# Patient Record
Sex: FEMALE | Race: OTHER | NOT HISPANIC OR LATINO
[De-identification: names, ages, dates, MRNs, and addresses within clinical notes are randomized per-mention and may not be internally consistent; named-entity substitution may affect disease eponyms.]

---

## 2017-03-27 ENCOUNTER — RESULT REVIEW (OUTPATIENT)
Age: 42
End: 2017-03-27

## 2021-11-09 ENCOUNTER — EMERGENCY (EMERGENCY)
Facility: HOSPITAL | Age: 46
LOS: 1 days | Discharge: HOME | End: 2021-11-09
Attending: EMERGENCY MEDICINE | Admitting: EMERGENCY MEDICINE
Payer: COMMERCIAL

## 2021-11-09 VITALS
OXYGEN SATURATION: 100 % | SYSTOLIC BLOOD PRESSURE: 96 MMHG | HEART RATE: 107 BPM | RESPIRATION RATE: 14 BRPM | DIASTOLIC BLOOD PRESSURE: 61 MMHG

## 2021-11-09 VITALS
RESPIRATION RATE: 16 BRPM | DIASTOLIC BLOOD PRESSURE: 79 MMHG | SYSTOLIC BLOOD PRESSURE: 101 MMHG | HEART RATE: 102 BPM | TEMPERATURE: 98 F | HEIGHT: 62 IN | OXYGEN SATURATION: 100 %

## 2021-11-09 LAB
ALBUMIN SERPL ELPH-MCNC: 3.8 G/DL — SIGNIFICANT CHANGE UP (ref 3.3–5)
ALP SERPL-CCNC: 61 U/L — SIGNIFICANT CHANGE UP (ref 40–120)
ALT FLD-CCNC: 14 U/L — SIGNIFICANT CHANGE UP (ref 4–33)
ANION GAP SERPL CALC-SCNC: 10 MMOL/L — SIGNIFICANT CHANGE UP (ref 7–14)
APPEARANCE UR: ABNORMAL
AST SERPL-CCNC: 16 U/L — SIGNIFICANT CHANGE UP (ref 4–32)
BASOPHILS # BLD AUTO: 0.03 K/UL — SIGNIFICANT CHANGE UP (ref 0–0.2)
BASOPHILS NFR BLD AUTO: 0.1 % — SIGNIFICANT CHANGE UP (ref 0–2)
BILIRUB SERPL-MCNC: 0.5 MG/DL — SIGNIFICANT CHANGE UP (ref 0.2–1.2)
BILIRUB UR-MCNC: NEGATIVE — SIGNIFICANT CHANGE UP
BLD GP AB SCN SERPL QL: NEGATIVE — SIGNIFICANT CHANGE UP
BUN SERPL-MCNC: 9 MG/DL — SIGNIFICANT CHANGE UP (ref 7–23)
CALCIUM SERPL-MCNC: 8 MG/DL — LOW (ref 8.4–10.5)
CHLORIDE SERPL-SCNC: 104 MMOL/L — SIGNIFICANT CHANGE UP (ref 98–107)
CO2 SERPL-SCNC: 21 MMOL/L — LOW (ref 22–31)
COLOR SPEC: ABNORMAL
CREAT SERPL-MCNC: 0.71 MG/DL — SIGNIFICANT CHANGE UP (ref 0.5–1.3)
DIFF PNL FLD: ABNORMAL
EOSINOPHIL # BLD AUTO: 0.05 K/UL — SIGNIFICANT CHANGE UP (ref 0–0.5)
EOSINOPHIL NFR BLD AUTO: 0.2 % — SIGNIFICANT CHANGE UP (ref 0–6)
GLUCOSE SERPL-MCNC: 168 MG/DL — HIGH (ref 70–99)
GLUCOSE UR QL: NEGATIVE — SIGNIFICANT CHANGE UP
HCG SERPL-ACNC: <5 MIU/ML — SIGNIFICANT CHANGE UP
HCT VFR BLD CALC: 26.1 % — LOW (ref 34.5–45)
HGB BLD-MCNC: 8.4 G/DL — LOW (ref 11.5–15.5)
IANC: 17.92 K/UL — HIGH (ref 1.5–8.5)
IMM GRANULOCYTES NFR BLD AUTO: 0.6 % — SIGNIFICANT CHANGE UP (ref 0–1.5)
KETONES UR-MCNC: NEGATIVE — SIGNIFICANT CHANGE UP
LEUKOCYTE ESTERASE UR-ACNC: NEGATIVE — SIGNIFICANT CHANGE UP
LYMPHOCYTES # BLD AUTO: 1.63 K/UL — SIGNIFICANT CHANGE UP (ref 1–3.3)
LYMPHOCYTES # BLD AUTO: 7.9 % — LOW (ref 13–44)
MCHC RBC-ENTMCNC: 28.2 PG — SIGNIFICANT CHANGE UP (ref 27–34)
MCHC RBC-ENTMCNC: 32.2 GM/DL — SIGNIFICANT CHANGE UP (ref 32–36)
MCV RBC AUTO: 87.6 FL — SIGNIFICANT CHANGE UP (ref 80–100)
MONOCYTES # BLD AUTO: 0.91 K/UL — HIGH (ref 0–0.9)
MONOCYTES NFR BLD AUTO: 4.4 % — SIGNIFICANT CHANGE UP (ref 2–14)
NEUTROPHILS # BLD AUTO: 17.92 K/UL — HIGH (ref 1.8–7.4)
NEUTROPHILS NFR BLD AUTO: 86.8 % — HIGH (ref 43–77)
NITRITE UR-MCNC: NEGATIVE — SIGNIFICANT CHANGE UP
NRBC # BLD: 0 /100 WBCS — SIGNIFICANT CHANGE UP
NRBC # FLD: 0.02 K/UL — HIGH
PH UR: 6.5 — SIGNIFICANT CHANGE UP (ref 5–8)
PLATELET # BLD AUTO: 304 K/UL — SIGNIFICANT CHANGE UP (ref 150–400)
POTASSIUM SERPL-MCNC: 3.7 MMOL/L — SIGNIFICANT CHANGE UP (ref 3.5–5.3)
POTASSIUM SERPL-SCNC: 3.7 MMOL/L — SIGNIFICANT CHANGE UP (ref 3.5–5.3)
PROT SERPL-MCNC: 6.8 G/DL — SIGNIFICANT CHANGE UP (ref 6–8.3)
PROT UR-MCNC: ABNORMAL
RBC # BLD: 2.98 M/UL — LOW (ref 3.8–5.2)
RBC # FLD: 13.6 % — SIGNIFICANT CHANGE UP (ref 10.3–14.5)
RH IG SCN BLD-IMP: POSITIVE — SIGNIFICANT CHANGE UP
SARS-COV-2 RNA SPEC QL NAA+PROBE: SIGNIFICANT CHANGE UP
SODIUM SERPL-SCNC: 135 MMOL/L — SIGNIFICANT CHANGE UP (ref 135–145)
SP GR SPEC: >1.05 (ref 1–1.05)
UROBILINOGEN FLD QL: SIGNIFICANT CHANGE UP
WBC # BLD: 20.66 K/UL — HIGH (ref 3.8–10.5)
WBC # FLD AUTO: 20.66 K/UL — HIGH (ref 3.8–10.5)

## 2021-11-09 PROCEDURE — 76830 TRANSVAGINAL US NON-OB: CPT | Mod: 26

## 2021-11-09 PROCEDURE — G1004: CPT

## 2021-11-09 PROCEDURE — 99285 EMERGENCY DEPT VISIT HI MDM: CPT

## 2021-11-09 PROCEDURE — 74177 CT ABD & PELVIS W/CONTRAST: CPT | Mod: 26,MG

## 2021-11-09 RX ORDER — MORPHINE SULFATE 50 MG/1
4 CAPSULE, EXTENDED RELEASE ORAL ONCE
Refills: 0 | Status: DISCONTINUED | OUTPATIENT
Start: 2021-11-09 | End: 2021-11-09

## 2021-11-09 RX ORDER — SODIUM CHLORIDE 9 MG/ML
1000 INJECTION INTRAMUSCULAR; INTRAVENOUS; SUBCUTANEOUS ONCE
Refills: 0 | Status: COMPLETED | OUTPATIENT
Start: 2021-11-09 | End: 2021-11-09

## 2021-11-09 RX ORDER — KETOROLAC TROMETHAMINE 30 MG/ML
15 SYRINGE (ML) INJECTION ONCE
Refills: 0 | Status: DISCONTINUED | OUTPATIENT
Start: 2021-11-09 | End: 2021-11-09

## 2021-11-09 RX ADMIN — SODIUM CHLORIDE 1000 MILLILITER(S): 9 INJECTION INTRAMUSCULAR; INTRAVENOUS; SUBCUTANEOUS at 07:00

## 2021-11-09 RX ADMIN — Medication 15 MILLIGRAM(S): at 10:03

## 2021-11-09 RX ADMIN — MORPHINE SULFATE 4 MILLIGRAM(S): 50 CAPSULE, EXTENDED RELEASE ORAL at 07:00

## 2021-11-09 RX ADMIN — MORPHINE SULFATE 4 MILLIGRAM(S): 50 CAPSULE, EXTENDED RELEASE ORAL at 06:02

## 2021-11-09 RX ADMIN — Medication 15 MILLIGRAM(S): at 09:20

## 2021-11-09 NOTE — ED PROVIDER NOTE - OBJECTIVE STATEMENT
45 Y f H/O heavy menses of unknown etiology, presenting with 1 day of excessive bleeding, abdominal pain. States 1 pad per hour for the past day, with new lower abdominal pain, in setting of chronic menorrhagia, with anemia. + subjective fever, dizziness, exertional dyspnea. Minimally responsive to ROS in setting of weakness. 45 Y f H/O Iron deficiency Anemia (iron infusion in August), heavy menses of unknown etiology, presenting with 1 day of excessive bleeding, abdominal pain. States 1 pad per hour for the past day, with new lower abdominal pain, in setting of chronic menorrhagia, with anemia. + subjective fever, dizziness, exertional dyspnea. Minimally responsive to ROS in setting of weakness. unciomfortable appearing.     Hematologist Dr. Joseph Perera

## 2021-11-09 NOTE — ED PROVIDER NOTE - NSFOLLOWUPCLINICS_GEN_ALL_ED_FT
[facility] Gynecology and Obstetrics  Gynceology/OB  [address] [place] [address]  Phone: [phone]  Fax:     Nidia Champagne OBGYN  OBGYN  [address] [place] [address]  Phone: [phone]  Fax: [phone]

## 2021-11-09 NOTE — ED PROVIDER NOTE - CPE EDP SKIN NORM
"Chief Complaint   Patient presents with     Physical     sports       Initial /67 (BP Location: Right arm, Patient Position: Sitting, Cuff Size: Adult Large)  Pulse 83  Temp 97.7  F (36.5  C) (Oral)  Resp 12  Ht 5' 3\" (1.6 m)  Wt 188 lb (85.3 kg)  LMP 05/03/2017  SpO2 99%  BMI 33.3 kg/m2 Estimated body mass index is 33.3 kg/(m^2) as calculated from the following:    Height as of this encounter: 5' 3\" (1.6 m).    Weight as of this encounter: 188 lb (85.3 kg).  Medication Reconciliation: complete     sma Jolynn  "
normal...

## 2021-11-09 NOTE — ED PROVIDER NOTE - PATIENT PORTAL LINK FT
You can access the FollowMyHealth Patient Portal offered by [facility] by registering at the following website: [email or web address]. By joining [facility]’s FollowMyHealth portal, you will also be able to view your health information using other applications (apps) compatible with our system. You can access the FollowMyHealth Patient Portal offered by [facility] by registering at the following website: [email or web address]. By joining [facility]’s FollowMyHealth portal, you will also be able to view your health information using other applications (apps) compatible with our system.

## 2021-11-09 NOTE — ED ADULT NURSE NOTE - OBJECTIVE STATEMENT
patient came to Er with c/o abdominal pain . cramping. nausea since sunday . currenty on her menstrual cycle. changing pads every hour. denies any medical problems

## 2021-11-09 NOTE — ED PROVIDER NOTE - ATTENDING CONTRIBUTION TO CARE
Attending Attestation: Dr. Avalos  I have personally performed a history and physical examination of the patient and discussed management with the resident as well as the patient.  I reviewed the resident's note and agree with the documented findings and plan of care.  I have authored and modified critical sections of the Provider Note, including but not limited to HPI, Physical Exam and MDM. Pt c h/o menorrhagia, CECILIA, who p/w severe pelvic pain, diffuse abd pain and tenderness.  No CP or SOB.  Afebrile.  Suspect fibroid related bleeding.  Will eval for anemia, provide analgesia, obtain TVUS, CTAP, labs and reassess.

## 2021-11-09 NOTE — ED PROVIDER NOTE - PROGRESS NOTE DETAILS
Pt feels better- awaiting repeat CBC and will discharge with gyn followup. Pt found to have eloped prior to discharge instructions and repeat CBC even though told to wait.  Tried to call back pt and did not answer- sent message to call back pa to followup pt.  Pt said to have ob/gyn and hematology followup this week.  Pt felt fine prior to dc, no dizziness, cp, etc.

## 2021-11-09 NOTE — ED PROVIDER NOTE - CLINICAL SUMMARY MEDICAL DECISION MAKING FREE TEXT BOX
Pt c h/o menorrhagia, CECILIA, who p/w severe pelvic pain, diffuse abd pain and tenderness.  No CP or SOB.  Afebrile.  Suspect fibroid related bleeding.  Will eval for anemia, provide analgesia, obtain TVUS, CTAP, labs and reassess.

## 2021-11-09 NOTE — ED PROVIDER NOTE - NSFOLLOWUPINSTRUCTIONS_ED_ALL_ED_FT
You were evaluated today for pelvic pain. Work up demonstrated fibroids to be the most likely etiology of your pain. Please monitor your symptoms. If you start to experience worsening pain, any nausea, vomiting, or any other concerning symptoms. Please follow up with your primary care provider and OBGYN for further work up and management.

## 2021-11-10 NOTE — ED POST DISCHARGE NOTE - REASON FOR FOLLOW-UP
Other Telephone call back request made by ED team to follow up with patient with vaginal bleeding, Pt LWOBE,  did not have repeat vitals/blood work .     Telephone request to follow up with patient and make sure she has gyn/onc, and hematology appointments as planned.       Pending call back. Call back team to follow

## 2021-11-11 ENCOUNTER — EMERGENCY (EMERGENCY)
Facility: HOSPITAL | Age: 46
LOS: 1 days | Discharge: HOME | End: 2021-11-11
Attending: EMERGENCY MEDICINE | Admitting: EMERGENCY MEDICINE
Payer: COMMERCIAL

## 2021-11-11 VITALS
OXYGEN SATURATION: 100 % | TEMPERATURE: 98 F | HEIGHT: 62 IN | RESPIRATION RATE: 20 BRPM | SYSTOLIC BLOOD PRESSURE: 144 MMHG | DIASTOLIC BLOOD PRESSURE: 101 MMHG | HEART RATE: 103 BPM

## 2021-11-11 LAB
ALBUMIN SERPL ELPH-MCNC: 3.9 G/DL — SIGNIFICANT CHANGE UP (ref 3.3–5)
ALP SERPL-CCNC: 56 U/L — SIGNIFICANT CHANGE UP (ref 40–120)
ALT FLD-CCNC: 12 U/L — SIGNIFICANT CHANGE UP (ref 4–33)
ANION GAP SERPL CALC-SCNC: 10 MMOL/L — SIGNIFICANT CHANGE UP (ref 7–14)
AST SERPL-CCNC: 12 U/L — SIGNIFICANT CHANGE UP (ref 4–32)
BASOPHILS # BLD AUTO: 0.03 K/UL — SIGNIFICANT CHANGE UP (ref 0–0.2)
BASOPHILS NFR BLD AUTO: 0.4 % — SIGNIFICANT CHANGE UP (ref 0–2)
BILIRUB SERPL-MCNC: <0.2 MG/DL — SIGNIFICANT CHANGE UP (ref 0.2–1.2)
BLD GP AB SCN SERPL QL: NEGATIVE — SIGNIFICANT CHANGE UP
BUN SERPL-MCNC: 7 MG/DL — SIGNIFICANT CHANGE UP (ref 7–23)
CALCIUM SERPL-MCNC: 8.6 MG/DL — SIGNIFICANT CHANGE UP (ref 8.4–10.5)
CHLORIDE SERPL-SCNC: 105 MMOL/L — SIGNIFICANT CHANGE UP (ref 98–107)
CO2 SERPL-SCNC: 24 MMOL/L — SIGNIFICANT CHANGE UP (ref 22–31)
CREAT SERPL-MCNC: 0.75 MG/DL — SIGNIFICANT CHANGE UP (ref 0.5–1.3)
EOSINOPHIL # BLD AUTO: 0.53 K/UL — HIGH (ref 0–0.5)
EOSINOPHIL NFR BLD AUTO: 6.8 % — HIGH (ref 0–6)
GLUCOSE SERPL-MCNC: 103 MG/DL — HIGH (ref 70–99)
HCT VFR BLD CALC: 23 % — LOW (ref 34.5–45)
HGB BLD-MCNC: 7.1 G/DL — LOW (ref 11.5–15.5)
IANC: 4.46 K/UL — SIGNIFICANT CHANGE UP (ref 1.5–8.5)
IMM GRANULOCYTES NFR BLD AUTO: 0.6 % — SIGNIFICANT CHANGE UP (ref 0–1.5)
LYMPHOCYTES # BLD AUTO: 2.3 K/UL — SIGNIFICANT CHANGE UP (ref 1–3.3)
LYMPHOCYTES # BLD AUTO: 29.4 % — SIGNIFICANT CHANGE UP (ref 13–44)
MCHC RBC-ENTMCNC: 26.9 PG — LOW (ref 27–34)
MCHC RBC-ENTMCNC: 30.9 GM/DL — LOW (ref 32–36)
MCV RBC AUTO: 87.1 FL — SIGNIFICANT CHANGE UP (ref 80–100)
MONOCYTES # BLD AUTO: 0.44 K/UL — SIGNIFICANT CHANGE UP (ref 0–0.9)
MONOCYTES NFR BLD AUTO: 5.6 % — SIGNIFICANT CHANGE UP (ref 2–14)
NEUTROPHILS # BLD AUTO: 4.46 K/UL — SIGNIFICANT CHANGE UP (ref 1.8–7.4)
NEUTROPHILS NFR BLD AUTO: 57.2 % — SIGNIFICANT CHANGE UP (ref 43–77)
NRBC # BLD: 0 /100 WBCS — SIGNIFICANT CHANGE UP
NRBC # FLD: 0 K/UL — SIGNIFICANT CHANGE UP
PLATELET # BLD AUTO: 389 K/UL — SIGNIFICANT CHANGE UP (ref 150–400)
POTASSIUM SERPL-MCNC: 3.7 MMOL/L — SIGNIFICANT CHANGE UP (ref 3.5–5.3)
POTASSIUM SERPL-SCNC: 3.7 MMOL/L — SIGNIFICANT CHANGE UP (ref 3.5–5.3)
PROT SERPL-MCNC: 6.9 G/DL — SIGNIFICANT CHANGE UP (ref 6–8.3)
RBC # BLD: 2.64 M/UL — LOW (ref 3.8–5.2)
RBC # FLD: 13.7 % — SIGNIFICANT CHANGE UP (ref 10.3–14.5)
RH IG SCN BLD-IMP: POSITIVE — SIGNIFICANT CHANGE UP
SARS-COV-2 RNA SPEC QL NAA+PROBE: SIGNIFICANT CHANGE UP
SODIUM SERPL-SCNC: 139 MMOL/L — SIGNIFICANT CHANGE UP (ref 135–145)
WBC # BLD: 7.81 K/UL — SIGNIFICANT CHANGE UP (ref 3.8–10.5)
WBC # FLD AUTO: 7.81 K/UL — SIGNIFICANT CHANGE UP (ref 3.8–10.5)

## 2021-11-11 PROCEDURE — 99220: CPT

## 2021-11-11 RX ORDER — ACETAMINOPHEN 500 MG
650 TABLET ORAL ONCE
Refills: 0 | Status: COMPLETED | OUTPATIENT
Start: 2021-11-11 | End: 2021-11-11

## 2021-11-11 RX ADMIN — Medication 650 MILLIGRAM(S): at 16:05

## 2021-11-11 RX ADMIN — Medication 650 MILLIGRAM(S): at 19:04

## 2021-11-11 NOTE — ED CDU PROVIDER INITIAL DAY NOTE - OBJECTIVE STATEMENT
46 y/o F pmh anemia reqiuring iron transfusion presnted to the ED with symptomatic anemia x 4 days. Pt reports this past sunday, she began experiencing heavy vaginal bleeding that continued until tuesday. Pt went to ED on tuesday, ctap and tvus revealed a Myomatous uterus. Pt endorsing fatigue, generalized weakness, and HERRERA. Pt f/u with Dr. White, hematologist, for a rountine visit. Found to have a Hgb of 7.2 Pt denies fever, chills, chest pain, abd pain, n/v/d.   In ED, Hgb 7.1. Pt sent to CDU for 1 unit transfusion of pRBC.

## 2021-11-11 NOTE — ED PROVIDER NOTE - COVID-19 RESULT DATE/TIME
no lesions,  no deformities,  no traumatic injuries,  no significant scars are present,  chest wall non-tender,  no masses present, breathing is unlabored without accessory muscle use, normal breath sounds
09-Nov-2021 06:11

## 2021-11-11 NOTE — ED CLERICAL - NS ED CLERK NOTE PRE-ARRIVAL INFORMATION; ADDITIONAL PRE-ARRIVAL INFORMATION
pt sent in for  blood transfusion Hgb of 7.2 c/o of dizziness, lightheadedness . If patient to be admitted please admit to Dr. Jayjay Padron.

## 2021-11-11 NOTE — ED ADULT TRIAGE NOTE - CHIEF COMPLAINT QUOTE
Pt was seen at [facility] Monday for abdominal pain and vaginal bleeding. Pt followed up with hematologist and bloodwood showed low H&H 7.2. Pt c/o weakness, dizziness, ans sob

## 2021-11-11 NOTE — ED PROVIDER NOTE - ATTENDING CONTRIBUTION TO CARE
Pt was seen and evaluated by me. Pt is a 44 y/o female with PMhx of anemia and Menorrhagia who presented to the ED for symptomatic anemia X days. Pt states over the past 4 days having vaginal bleeding and then having some weakness with SOB with minimal exertion. Pt notes having previous heavy bleeding but has had iron infusions. Pt was found to have a Hg of 7.2 by her Hematologist and sent in for transfusion. Pt denies any fever, chills, chest pain, nausea, vomiting, or abd pain. Pt notes vaginal bleeding has improved. Lungs CTA b/l. RRR. Abd soft, non-tender.  Concern for symptomatic anemia  Labs, EKG, Likely transfusion

## 2021-11-11 NOTE — ED ADULT NURSE REASSESSMENT NOTE - NS ED NURSE REASSESS COMMENT FT1
Er pt Hgb 7.1 for Vag. will be going to CDU for anemia, pt AOX 3 denies CP, no SOB at present time. 1st unit ordered and started at 7:45PM to right  area, Unit# [id number] type O Pos.    Hortencia Brown RN.    pt stated been in hallway not too happy.   We explained ( Facilitator Vincent) and my self that she will be going to CDU for over night,  pending COVID result.  Hortencia Brown RN
Report given to CDU RN. Patient A&O x 4 at time of transfer. No sign and symptoms of blood transfusion noted. Patient denies SOB, chest pain, Stretcher in lowest position, wheels locked, appropriate side rails in place, call bell in reach.

## 2021-11-11 NOTE — ED PROVIDER NOTE - OBJECTIVE STATEMENT
46 y/o female with PMhx of anemia and Menorrhagia who presented to the ED for symptomatic anemia X days. Pt states over the past 4 days having vaginal bleeding and then having some weakness with SOB with minimal exertion. Pt notes having previous heavy bleeding but has had iron infusions. Pt was found to have a Hg of 7.2 by her Hematologist and sent in for transfusion. Pt denies any fever, chills, chest pain, nausea, vomiting, or abd pain. Pt notes vaginal bleeding has improved.

## 2021-11-11 NOTE — ED CDU PROVIDER INITIAL DAY NOTE - PHYSICAL EXAMINATION
Vital signs reviewed.   CONSTITUTIONAL: Well-appearing; well-nourished; in no apparent distress. Non-toxic appearing.   HEAD: Normocephalic, atraumatic.  EYES: PERRL, EOM intact, conjunctiva and sclera WNL.  ENT: normal nose; no rhinorrhea; normal pharynx with no tonsillar swelling, no erythema, no exudate, no lymphadenopathy. no mucosal lesions   CARD: Regular Rate and Rhythm. Normal heart sounds  RESP: Normal chest excursion with respiration; breath sounds clear and equal bilaterally; no wheezes, rhonchi, or rales.  ABD/GI: soft, non-distended; non-tender   EXT/MS: moves all extremities;   SKIN: Normal for age and race; warm; no rashes noted.   NEURO: Awake, alert, oriented x 3  PSYCH: Normal mood; appropriate affect.

## 2021-11-11 NOTE — ED CDU PROVIDER INITIAL DAY NOTE - MEDICAL DECISION MAKING DETAILS
anemia 2/2 vaginal bleeding due to fibroids, sent to cdu for 1unit pRBC 44 y/o female with PMhx of anemia and Menorrhagia who presented to the ED for symptomatic anemia X days.   Sent to OBS for transfusion  Concern for symptomatic anemia  Sent to OBS for transfusion

## 2021-11-11 NOTE — ED ADULT NURSE NOTE - OBJECTIVE STATEMENT
aaox4, in NAD returns to ED last visit tuesday for worsening lightheadedness, palpitations, and dizziness, and low Hgb of 7.2 referred for blood transfusion. all other ros negative. endorses pmhs of uterine fibroids with heavy vaginal bleeding x2 weeks.

## 2021-11-11 NOTE — ED PROVIDER NOTE - CLINICAL SUMMARY MEDICAL DECISION MAKING FREE TEXT BOX
44 y/o female with PMhx of anemia and Menorrhagia who presented to the ED for symptomatic anemia X days.   Concern for symptomatic anemia  Labs, EKG, Likely transfusion

## 2021-11-11 NOTE — ED ADULT NURSE NOTE - CHIEF COMPLAINT QUOTE
Pt was seen at [facility] Monday for abdominal pain and vaginal bleeding. Pt followed up with hematologist and bloodwood showed low H&H 7.2. Pt c/o weakness, dizziness, ans sob

## 2021-11-11 NOTE — ED CDU PROVIDER INITIAL DAY NOTE - ATTENDING CONTRIBUTION TO CARE
Pt was seen and evaluated by me. Pt is a 44 y/o female with PMhx of anemia and Menorrhagia who presented to the ED for symptomatic anemia X days. Pt states over the past 4 days having vaginal bleeding and then having some weakness with SOB with minimal exertion. Pt notes having previous heavy bleeding but has had iron infusions. Pt was found to have a Hg of 7.2 by her Hematologist and sent in for transfusion. Pt denies any fever, chills, chest pain, nausea, vomiting, or abd pain. Pt notes vaginal bleeding has improved. Lungs CTA b/l. RRR. Abd soft, non-tender.  Sent to OBS for transfusion  Concern for symptomatic anemia  Sent to OBS for transfusion

## 2021-11-11 NOTE — ED CDU PROVIDER INITIAL DAY NOTE - NSICDXFAMILYHX_GEN_ALL_CORE_FT
FAMILY HISTORY:  Father  Still living? Unknown  FH: CAD (coronary artery disease), Age at diagnosis: Age Unknown  FH: diabetes mellitus, Age at diagnosis: Age Unknown

## 2021-11-12 VITALS
TEMPERATURE: 99 F | DIASTOLIC BLOOD PRESSURE: 82 MMHG | SYSTOLIC BLOOD PRESSURE: 118 MMHG | OXYGEN SATURATION: 100 % | HEART RATE: 93 BPM | RESPIRATION RATE: 16 BRPM

## 2021-11-12 LAB
HCT VFR BLD CALC: 25.8 % — LOW (ref 34.5–45)
HGB BLD-MCNC: 8.1 G/DL — LOW (ref 11.5–15.5)
MCHC RBC-ENTMCNC: 28 PG — SIGNIFICANT CHANGE UP (ref 27–34)
MCHC RBC-ENTMCNC: 31.4 GM/DL — LOW (ref 32–36)
MCV RBC AUTO: 89.3 FL — SIGNIFICANT CHANGE UP (ref 80–100)
NRBC # BLD: 0 /100 WBCS — SIGNIFICANT CHANGE UP
NRBC # FLD: 0 K/UL — SIGNIFICANT CHANGE UP
PLATELET # BLD AUTO: 373 K/UL — SIGNIFICANT CHANGE UP (ref 150–400)
RBC # BLD: 2.89 M/UL — LOW (ref 3.8–5.2)
RBC # FLD: 13.5 % — SIGNIFICANT CHANGE UP (ref 10.3–14.5)
WBC # BLD: 7.63 K/UL — SIGNIFICANT CHANGE UP (ref 3.8–10.5)
WBC # FLD AUTO: 7.63 K/UL — SIGNIFICANT CHANGE UP (ref 3.8–10.5)

## 2021-11-12 PROCEDURE — 99217: CPT

## 2021-11-12 NOTE — ED CDU PROVIDER SUBSEQUENT DAY NOTE - ATTENDING CONTRIBUTION TO CARE
46 y/o F pmh anemia requiring iron transfusion, follows with hematologist Dr. White, patient presented to ED c/o symptomatic anemia x 2 weeks worse of past 4 days with heavy vaginal bleeding.   Pt went to ED on tuesday and had ctap and tvus revealed a Myomatous uterus, hgb was 8.4. Pt returned to ED yesterday with worsening fatigue with HERRERA and palpitations. She saw her hematologist who noted she was anemic on outpatient BW and advised she go to ED for transfusion. In ED patient found to have hgb of 7.1. Still with vaginal bleeding but improving. Pt transferred to CDU for 1 unit PRBC and rpt CBC.     In interim- Rpt cbc 8.1. Pt feeling better but still notes some mild lightheadedness. Notes vaginal bleeding is subsiding. Will continue with current CDU tx plan and reassess again in AM.

## 2021-11-12 NOTE — ED CDU PROVIDER DISPOSITION NOTE - ATTENDING CONTRIBUTION TO CARE
46 y/o female pmh anemia c/o symptomatic anemia 2/2 vaginal bleeding. Pt was found to have h/h of 7.1/23.0 and sent to the CDU. Pt Received 1 unit PRBC and h/h improved to 8.1/25.8. Pt felt better and did not want a 2nd unit of blood. PT spoke with her hematologist Dr. White who was comfortable with dc and will see pt in the office next week for iron transfusion.

## 2021-11-12 NOTE — ED CDU PROVIDER SUBSEQUENT DAY NOTE - PROGRESS NOTE DETAILS
Discussed results with patient, stated she still feels fatigued but unsure if this is chronic 2/2 baseline anemia. Did shared decision making and stated if she was still symptomatic we could decide if she needed another unit given still having mild bleeding. Pt would like to wait until AM to reassess and decide. Will also try to have day team get in touch with Dr. White. Will continue to monitor closely. Pt feeling better after ER stay. Anemia symptoms improved, h/h improved accordingly. Pt spoke with her hematologist and would like to be dc. she will see Dr. White in the office next week for iron transfusion. pt is stable for dc.

## 2021-11-12 NOTE — PROGRESS NOTE ADULT - ASSESSMENT
45F with CECILIA, heavy menstruation in last few days with drop in hgb and symptoms of dizziness, SOB, palpitations, very week, s/p transfusion and is feeling better, has a gyn appointment tomorrow, will recommend:    - continue Rx as per CDU.  - on oral iron, will arrange for IV iron again as outpt (pt received it before)  - gyn evaluation, has an appointment tomorrow as outpt, if discharged  - all other supportive Rx    - to f/u as outpt after discharge    for questions, please call [phone].

## 2021-11-12 NOTE — PROGRESS NOTE ADULT - SUBJECTIVE AND OBJECTIVE BOX
HPI: Pt with CECILIA, heavy menstruation in last few days with drop in hgb and symptoms of dizziness, SOB, palpitations, very week, s/p transfusion and is feeling better, has a gyn appointment tomorrow. ROS is otherwise unremarkable.          Vital Signs Last 24 Hrs  T(C): 36.7 (12 Nov 2021 07:00), Max: 37 (11 Nov 2021 20:00)  T(F): 98 (12 Nov 2021 07:00), Max: 98.6 (11 Nov 2021 20:00)  HR: 92 (12 Nov 2021 07:00) (92 - 109)  BP: 124/94 (12 Nov 2021 07:00) (121/74 - 144/101)  BP(mean): --  RR: 18 (12 Nov 2021 07:00) (17 - 20)  SpO2: 100% (12 Nov 2021 07:00) (100% - 100%)                          8.1    7.63  )-----------( 373      ( 12 Nov 2021 02:44 )             25.8       11-11    139  |  105  |  7   ----------------------------<  103<H>  3.7   |  24  |  0.75    Ca    8.6      11 Nov 2021 16:24    TPro  6.9  /  Alb  3.9  /  TBili  <0.2  /  DBili  x   /  AST  12  /  ALT  12  /  AlkPhos  56  11-11

## 2021-11-12 NOTE — ED CDU PROVIDER SUBSEQUENT DAY NOTE - PHYSICAL EXAMINATION
Vital signs reviewed.   CONSTITUTIONAL: Well-appearing; well-nourished; in no apparent distress. Non-toxic appearing.   HEAD: Normocephalic, atraumatic.  EYES: PERRL, EOM intact, conjunctiva and sclera WNL.  CARD: Normal S1, S2; no murmurs, rubs, or gallops noted.  RESP: Normal chest excursion with respiration; breath sounds clear and equal bilaterally; no wheezes, rhonchi, or rales.  ABD/GI: soft, non-distended; non-tender  EXT/MS: moves all extremities  SKIN: Normal for age and race  NEURO: Awake, alert, oriented x 3, no gross deficits  PSYCH: Normal mood; appropriate affect.

## 2021-11-12 NOTE — ED CDU PROVIDER DISPOSITION NOTE - PATIENT PORTAL LINK FT
You can access the FollowMyHealth Patient Portal offered by [facility] by registering at the following website: [email or web address]. By joining [email or web address]’s FollowMyHealth portal, you will also be able to view your health information using other applications (apps) compatible with our system.

## 2021-11-12 NOTE — ED CDU PROVIDER SUBSEQUENT DAY NOTE - MEDICAL DECISION MAKING DETAILS
Pt is a 46 y/o F pmh anemia requiring iron transfusion, follows with hematologist Dr. White, patient presented to ED c/o symptomatic anemia x 2 weeks worse of past 4 days with heavy vaginal bleeding.   Pt went to ED on tuesday and had ctap and tvus revealed a Myomatous uterus, hgb was 8.4. Pt returned to ED yesterday with worsening fatigue with HERRERA and palpitations. She saw her hematologist who noted she was anemic on outpatient BW and advised she go to ED for transfusion. In ED patient found to have hgb of 7.1. Still with vaginal bleeding but improving. Pt transferred to CDU for 1 unit PRBC and rpt CBC.     In interim- Rpt cbc 8.1. Pt feeling better but still notes some mild lightheadedness. Notes vaginal bleeding is subsiding. Will continue with current CDU tx plan and reassess again in AM.

## 2021-11-12 NOTE — ED CDU PROVIDER SUBSEQUENT DAY NOTE - HISTORY
Pt is a 44 y/o F pmh anemia requiring iron transfusion, follows with hematologist Dr. White, patient presented to ED c/o symptomatic anemia x 2 weeks worse of past 4 days with heavy vaginal bleeding.   Pt went to ED on tuesday and had ctap and tvus revealed a Myomatous uterus, hgb was 8.4. Pt returned to ED yesterday with worsening fatigue with HERRERA and palpitations. She saw her hematologist who noted she was anemic on outpatient BW and advised she go to ED for transfusion. In ED patient found to have hgb of 7.1. Still with vaginal bleeding but improving. Pt transferred to CDU for 1 unit PRBC and rpt CBC.     In interim- Rpt cbc 8.1. Pt feeling better but still notes some mild lightheadedness. Notes vaginal bleeding is subsiding. Will continue with current CDU tx plan and reassess again in AM.

## 2021-11-12 NOTE — ED CDU PROVIDER SUBSEQUENT DAY NOTE - NSICDXFAMILYHX_GEN_ALL_CORE_FT
FAMILY HISTORY:  Father  Still living? Unknown  FH: CAD (coronary artery disease), Age at diagnosis: Age Unknown  FH: diabetes mellitus, Age at diagnosis: Age Unknown    
- - -

## 2022-12-12 ENCOUNTER — NON-APPOINTMENT (OUTPATIENT)
Age: 47
End: 2022-12-12

## 2024-01-15 NOTE — ED PROVIDER NOTE - NS ED MD EM SELECTION
Generally they send a letter I believe. Normal mammogram. Recommend to repeat per breast cancer screening guidelines. For her age would be every other year. She does have the option to repeat her mammogram once yearly however. Should notify us with new symptoms or concerns.    [id number] Critical Care - 30 to 74 minutes

## 2024-02-06 ENCOUNTER — APPOINTMENT (OUTPATIENT)
Dept: OBGYN | Facility: CLINIC | Age: 49
End: 2024-02-06
Payer: COMMERCIAL

## 2024-02-06 PROCEDURE — 82270 OCCULT BLOOD FECES: CPT

## 2024-02-06 PROCEDURE — 81002 URINALYSIS NONAUTO W/O SCOPE: CPT

## 2024-02-06 PROCEDURE — 76830 TRANSVAGINAL US NON-OB: CPT

## 2024-02-06 PROCEDURE — 99396 PREV VISIT EST AGE 40-64: CPT

## 2024-02-27 ENCOUNTER — APPOINTMENT (OUTPATIENT)
Dept: OBGYN | Facility: CLINIC | Age: 49
End: 2024-02-27
Payer: COMMERCIAL

## 2024-02-27 PROCEDURE — 99213 OFFICE O/P EST LOW 20 MIN: CPT | Mod: 25

## 2024-02-27 PROCEDURE — 58100 BIOPSY OF UTERUS LINING: CPT

## 2024-02-29 ENCOUNTER — APPOINTMENT (OUTPATIENT)
Dept: OBGYN | Facility: CLINIC | Age: 49
End: 2024-02-29
Payer: COMMERCIAL

## 2024-02-29 PROCEDURE — 58563Z: CUSTOM

## 2024-02-29 PROCEDURE — 81025 URINE PREGNANCY TEST: CPT

## 2024-03-14 ENCOUNTER — APPOINTMENT (OUTPATIENT)
Dept: OBGYN | Facility: CLINIC | Age: 49
End: 2024-03-14
Payer: COMMERCIAL

## 2024-03-14 PROCEDURE — 99211 OFF/OP EST MAY X REQ PHY/QHP: CPT

## 2024-03-14 PROCEDURE — 76830 TRANSVAGINAL US NON-OB: CPT

## 2024-06-20 ENCOUNTER — APPOINTMENT (OUTPATIENT)
Dept: OBGYN | Facility: CLINIC | Age: 49
End: 2024-06-20
Payer: COMMERCIAL

## 2024-06-20 PROCEDURE — 76830 TRANSVAGINAL US NON-OB: CPT

## 2024-06-20 PROCEDURE — 99213 OFFICE O/P EST LOW 20 MIN: CPT

## 2025-02-11 ENCOUNTER — APPOINTMENT (OUTPATIENT)
Dept: OBGYN | Facility: CLINIC | Age: 50
End: 2025-02-11
Payer: COMMERCIAL

## 2025-02-11 PROCEDURE — 99459 PELVIC EXAMINATION: CPT

## 2025-02-11 PROCEDURE — 76856 US EXAM PELVIC COMPLETE: CPT | Mod: 59

## 2025-02-11 PROCEDURE — 76830 TRANSVAGINAL US NON-OB: CPT

## 2025-02-11 PROCEDURE — 81002 URINALYSIS NONAUTO W/O SCOPE: CPT

## 2025-02-11 PROCEDURE — 82270 OCCULT BLOOD FECES: CPT

## 2025-02-11 PROCEDURE — 99396 PREV VISIT EST AGE 40-64: CPT
